# Patient Record
Sex: FEMALE | Race: WHITE | URBAN - METROPOLITAN AREA
[De-identification: names, ages, dates, MRNs, and addresses within clinical notes are randomized per-mention and may not be internally consistent; named-entity substitution may affect disease eponyms.]

---

## 2019-02-04 ENCOUNTER — DOCUMENTATION ONLY (OUTPATIENT)
Dept: CARE COORDINATION | Facility: CLINIC | Age: 59
End: 2019-02-04

## 2019-02-12 NOTE — TELEPHONE ENCOUNTER
FUTURE VISIT INFORMATION      FUTURE VISIT INFORMATION:    Date: 2.25.19    Time: 7:55 Am    Location: [facility] clinic  REFERRAL INFORMATION:    Referring provider:  Dr. Jackie Edwards    Referring providers clinic:  [facility]    Reason for visit/diagnosis  Allergies    RECORDS REQUESTED FROM:       Clinic name Comments Records Status Imaging Status   [facility]  Received and placed in provider's folder                                    2.12.19 spoke with yanet at Dr. Edwards' office. They will be faxing med recs.  2.19.19 Records in provider's folder.

## 2019-02-25 ENCOUNTER — PRE VISIT (OUTPATIENT)
Dept: PULMONOLOGY | Facility: CLINIC | Age: 59
End: 2019-02-25